# Patient Record
Sex: MALE | Race: WHITE | Employment: FULL TIME | URBAN - METROPOLITAN AREA
[De-identification: names, ages, dates, MRNs, and addresses within clinical notes are randomized per-mention and may not be internally consistent; named-entity substitution may affect disease eponyms.]

---

## 2017-06-02 ENCOUNTER — HOSPITAL ENCOUNTER (OUTPATIENT)
Dept: LAB | Age: 27
Discharge: HOME | End: 2017-06-02

## 2017-06-02 PROCEDURE — 88305 TISSUE EXAM BY PATHOLOGIST: CPT | Performed by: INTERNAL MEDICINE

## 2021-07-16 ENCOUNTER — TRANSCRIBE ORDER (OUTPATIENT)
Dept: SCHEDULING | Age: 31
End: 2021-07-16

## 2021-07-16 DIAGNOSIS — M54.12 CERVICAL RADICULOPATHY: Primary | ICD-10-CM

## 2021-08-03 ENCOUNTER — HOSPITAL ENCOUNTER (OUTPATIENT)
Dept: MRI IMAGING | Age: 31
Discharge: HOME | End: 2021-08-03
Attending: INTERNAL MEDICINE
Payer: COMMERCIAL

## 2021-08-03 DIAGNOSIS — M54.12 CERVICAL RADICULOPATHY: ICD-10-CM

## 2021-08-03 PROCEDURE — 72141 MRI NECK SPINE W/O DYE: CPT

## 2021-08-26 PROBLEM — M54.2 NECK PAIN: Status: ACTIVE | Noted: 2021-08-26

## 2021-08-26 PROBLEM — M50.30 DDD (DEGENERATIVE DISC DISEASE), CERVICAL: Status: ACTIVE | Noted: 2021-08-26

## 2022-03-18 PROBLEM — M50.30 DDD (DEGENERATIVE DISC DISEASE), CERVICAL: Status: ACTIVE | Noted: 2021-08-26

## 2022-03-19 PROBLEM — M54.2 NECK PAIN: Status: ACTIVE | Noted: 2021-08-26

## 2022-07-20 ENCOUNTER — OFFICE VISIT (OUTPATIENT)
Dept: NEUROSURGERY | Age: 32
End: 2022-07-20
Payer: COMMERCIAL

## 2022-07-20 VITALS
BODY MASS INDEX: 39.37 KG/M2 | TEMPERATURE: 98.1 F | HEART RATE: 89 BPM | SYSTOLIC BLOOD PRESSURE: 125 MMHG | DIASTOLIC BLOOD PRESSURE: 55 MMHG | WEIGHT: 275 LBS | OXYGEN SATURATION: 96 % | HEIGHT: 70 IN

## 2022-07-20 DIAGNOSIS — M54.50 LOW BACK PAIN, UNSPECIFIED BACK PAIN LATERALITY, UNSPECIFIED CHRONICITY, UNSPECIFIED WHETHER SCIATICA PRESENT: Primary | ICD-10-CM

## 2022-07-20 PROCEDURE — 99214 OFFICE O/P EST MOD 30 MIN: CPT | Performed by: NURSE PRACTITIONER

## 2022-07-20 RX ORDER — MELOXICAM 15 MG/1
15 TABLET ORAL DAILY
Qty: 30 TABLET | Refills: 3 | Status: SHIPPED | OUTPATIENT
Start: 2022-07-20

## 2022-07-20 ASSESSMENT — PATIENT HEALTH QUESTIONNAIRE - PHQ9
1. LITTLE INTEREST OR PLEASURE IN DOING THINGS: 0
SUM OF ALL RESPONSES TO PHQ QUESTIONS 1-9: 0
2. FEELING DOWN, DEPRESSED OR HOPELESS: 0
SUM OF ALL RESPONSES TO PHQ9 QUESTIONS 1 & 2: 0

## 2022-07-20 NOTE — PROGRESS NOTES
[facility] NOTE:   History of Present Illness:    CC: Right-sided low back pain    Emilie Donald is a 32 y.o. male here to be evaluated for right-sided low back pain. Patient states that he has been having a lot of pain just off to the right side of his tailbone down into his buttock. Patient states that the pain is made worse with sitting for long periods of time. Patient states he will often feel very stiff. Patient states that every so often he will have a little bit of pain that runs in his right leg down into the back of his calf. Patient states every 2 months or so he will have an episode where the pain is much worse than normal.  Patient notes that he has been seeing a chiropractor to help with this. Patient states he has been noticing this over the past year.     Past Medical History:   Diagnosis Date    Adenomatous colon polyp     Asthma     BMI 39.0-39.9,adult     Cervical radiculopathy     HLD (hyperlipidemia)     Vitamin D deficiency       Past Surgical History:   Procedure Laterality Date    APPENDECTOMY      COLONOSCOPY  2014    adenomatous colon polyp  2017 dr. Vasquez Francisco EXTRACTION       Allergies   Allergen Reactions    Wasp Venom Protein Anaphylaxis    Amoxicillin Diarrhea    Other      cats      Family History   Problem Relation Age of Onset    Colon Cancer Maternal Grandfather     Thyroid Disease Mother     Alcohol Abuse Father     Heart Disease Brother     Parkinson's Disease Maternal Grandmother     Coronary Art Dis Father       Social History     Socioeconomic History    Marital status:      Spouse name: Not on file    Number of children: Not on file    Years of education: Not on file    Highest education level: Not on file   Occupational History    Not on file   Tobacco Use    Smoking status: Never    Smokeless tobacco: Never   Substance and Sexual Activity    Alcohol use: No    Drug use: No    Sexual activity: Not on file   Other Topics Concern    Not on file   Social History Narrative    Not on file     Social Determinants of Health     Financial Resource Strain: Not on file   Food Insecurity: Not on file   Transportation Needs: Not on file   Physical Activity: Not on file   Stress: Not on file   Social Connections: Not on file   Intimate Partner Violence: Not on file   Housing Stability: Not on file     Current Outpatient Medications   Medication Sig Dispense Refill    albuterol sulfate  (90 Base) MCG/ACT inhaler Inhale 2 puffs into the lungs every 6 hours as needed      EPINEPHrine (EPIPEN) 0.3 MG/0.3ML SOAJ injection Inject 0.3 mg into the muscle once as needed       No current facility-administered medications for this visit. Patient Active Problem List   Diagnosis    DDD (degenerative disc disease), cervical    Neck pain          ROS Review of Systems    Constitutional:                    No recent weight changes, fever, fatigue, sleep difficulties, loss of appetite   ENT/Mouth:  No hearing loss, No ringing in the ears, chronic sinus problem, nose bleeds,sore throat, voice change, hoarseness, swollen glands in neck, or difficulties with chewing and swallowing. Cardiovascular:  No chest pain/angina pectoris, palpitations, swelling of feet/ankles/hands, or calf pain while walking. Respiratory: No chronic or frequent coughs, spitting up blood, shortness of breath, No asthma, or wheezing. Gastrointestinal: No a bdominal pain, heartburn, nausea, vomiting, constipation, or frequent diarrhea     Genitourinary: No frequent urination, burning or painful urination, or blood in urine     Musculoskeletal:   POS low back pain     Integument:   No rash/itching     Neurological:   Dizziness/vertigo, No numbness/tingling sensation, tremors, No weakness in limbs, frequent or recurring headaches, memory loss or confusion.        Physical Exam:    General: No acute distress  Head normocephalic and atraumatic  Mood and affect appropriate  CV: Regular rate   Resp: No increased work of breathing  Skin: warm and dry   Awake, alert, and oriented   Speech fluent  Eyes open spontaneously   Face symmetric and tongue midline on protrusion  Sternocleidomastoid and trapezius 5/5  No mid-line cervical, thoracic, or lumbar tenderness to palpation   Patient with strength exam as follows:   Upper Extremities: Right Left      Deltoid  5 5    Biceps  5 5    Triceps 5 5      5 5   Hand Intrinsics  5 5  Wrist flexors/extensors  5 5     Lower Extremities:      Hip Flex 5 5    Quads  5 5    Hamstrings 5 5    Dorsiflex 5 5    Plantarflex 5 5    EHL  5 5  Sensation intact to light touch and pin-prick   DTR 2+  No clonus or babinski present   No Yadav's sign present bilaterally   Gait normal    Assessment & Plan:  Randol Severs is a 32 y.o. male who presents to be evaluated for his right-sided low back pain. Understanding the patient for a 6-week course of physical therapy for his low back. I am also calling in a prescription for Mobic for the patient to take help with inflammation he may be experiencing. Patient will follow up with me in 5 weeks to discuss efficacy of therapy. No diagnosis found. Notes are transcribed with [employer], a medical voice recording dictation service, and may contain minor errors.     Román Serrano, EDDIE  [address]

## 2022-08-02 ENCOUNTER — HOSPITAL ENCOUNTER (OUTPATIENT)
Dept: PHYSICAL THERAPY | Age: 32
Setting detail: RECURRING SERIES
Discharge: HOME | End: 2022-08-05
Payer: COMMERCIAL

## 2022-08-02 PROCEDURE — 97161 PT EVAL LOW COMPLEX 20 MIN: CPT

## 2022-08-02 PROCEDURE — 97110 THERAPEUTIC EXERCISES: CPT

## 2022-08-02 ASSESSMENT — PAIN SCALES - GENERAL: PAINLEVEL_OUTOF10: 1

## 2022-08-02 NOTE — THERAPY EVALUATION
Edmar Barrett  : 1990  Primary: Gabbi Bahenaanurag Christensen  Secondary:  [address] [facility] [address]  Phone: [phone]  Fax: [phone] Plan Frequency: 2x/ week for 6 weeks  Plan of Care/Certification Expiration Date: 22    PT Visit Info:    No data recorded    Visit Count:  1    OUTPATIENT PHYSICAL THERAPY:OP NOTE TYPE: Initial Assessment 2022               Episode  Appt Desk         Treatment Diagnosis:    Low Back Pain (M54.5)  Abnormal posture (R29.3)  Medical/Referring Diagnosis:  No admission diagnoses are documented for this encounter. Referring Physician:  ELSIE Clancy MD Orders:  PT Eval and Treat   Return MD Appt:  22  Date of Onset:  No data recorded   Allergies:  Wasp venom protein, Amoxicillin, and Other  Restrictions/Precautions:    No data recordedNo data recorded   Medications Last Reviewed:  2022     SUBJECTIVE   History of Injury/Illness (Reason for Referral):  Pt has had low back pain for last couple of years. Pt had a radicular pattern from his low back to his gluteal fold on his right side on . The radicular pattern only occurs every month or so when his low back pain has increased. Patient Stated Goal(s): \"Pt stated he wants to be able to /play with his 3 kids without low back pain. \"  Initial:     1/10 Post Session:     0/10  Past Medical History/Comorbidities:   Mr. Angeli Mejía  has a past medical history of Adenomatous colon polyp, Asthma, BMI 39.0-39.9,adult, Cervical radiculopathy, HLD (hyperlipidemia), and Vitamin D deficiency. Mr. Angeli Mejía  has a past surgical history that includes Appendectomy; Colonoscopy (); Septoplasty; and Moodus tooth extraction. Social History/Living Environment:   Lives With: Spouse;  Family  Type of Home: House  Home Layout: Multi-level   Prior Level of Function/Work/Activity:   Prior level of function: Independent  Occupation: Full time employment  Type of Occupation: IT tech  ADL Assistance: IndependentMode of Transportation: [address] recorded   Learning:   Does the patient/guardian have any barriers to learning?: No barriers  Will there be a co-learner?: No  What is the preferred language of the patient/guardian?: English  Is an  required?: No   Fall Risk Scale:   No data recorded         OBJECTIVE     Observation/Orthostatic Postural Assessment:  [] This section not tested    General observations   Right lateral shift        Range of Motion   [] This section not tested    AROM     Lumbar    Flexion: mid-shin    Extension: pass midline      Hip         PROM    Hip            Strength  [] This section not tested      Right Left   Hip     Flexion 5/5 5/5   Extension       Abduction       Adduction       Knee     Flexion 5/5 5/5   Extension 5/5 5/5   Ankle     Dorsiflexion 5/5 5/5   Plantarflexion             Special Tests  [] This section not tested    Test/Result   Lumbar:       Straight Leg Raise Test; right: +, left: -      Slump Test: +          Hip:               Joint Accessory Motion Assessment/Palpation   [x] This section not tested    Joint         Palpation           Neurological Screen  [x] This section not tested    Test/Result   Dermatomes:       L UE:        R UE:        L LE:        R LE:    Myotomes:       L UE:        R UE:        L LE:        R LE:    Reflexes:       Upper Quarter:        Lower Quarter            Functional Mobility  [] This section not tested    Test Result   Timed up and go     30 second sit to stand  11   6 minute walk test     Single Leg Balance         Test Comments   Sit to stand    Squat    Stair Negotiation       ASSESSMENT   Initial Assessment:    Layne Medina presents to physical therapy with decreased strength, ROM, joint mobility, functional mobility. These S/S are consistent with low back pain.  Patient will benefit from skilled physical therapy for manual therapeutic techniques (as appropriate), therapeutic exercises and activities, balance and comprehensive home exercises program to address current impairments and functional limitations. Problem List: (Impacting functional limitations): Body Structures, Functions, Activity Limitations Requiring Skilled Therapeutic Intervention: Decreased body mechanics; Decreased functional mobility ; Decreased coordination; Decreased posture; Decreased strength   Therapy Prognosis:   Therapy Prognosis: Excellent   Assessment Complexity:   Low Complexity  PLAN   Effective Dates: 08/02/22 TO Plan of Care/Certification Expiration Date: 09/13/22   Frequency/Duration: Plan Frequency: 2x/ week for 6 weeks   Interventions Planned (Treatment may consist of any combination of the following):    Current Treatment Recommendations: Strengthening; ROM; Balance training; Functional mobility training; Stair training; Manual Therapy - Soft Tissue Mobilization; Manual Therapy - Joint Manipulation; Return to work related activity; Home exercise program       Goals: (Goals have been discussed and agreed upon with patient.) In Progress Goal Met  Goal Not met   Short-Term Functional Goals: Time Frame: 3 weeks      1. Emilie Donald will be independent with HEP. [x] [] []   2. Emilie Donald will be able to centralize his low back pain to regain daily function. [x] [] []   3. Emilie Donald will be able to sit longer than 30 minutes with less than or equal to 1/10 low back pain. [x] [] []         Discharge Goals: Time Frame: 6 weeks       1. Emilie Donald will be able to deadlift his body weight in order to go back to functional activities of lifting. [x] [] []   2. Emilie Donald will be able to squat 50% if his body weight in order to do his job requirements. [x] [] []   3. Emilie Donald will be able to improve his modified oswetry low back pain questionnaire by the [employer] in order to regain function. [x] [] []              Outcome Measure:    Tool Used: Modified Oswestry Low

## 2022-08-03 NOTE — PROGRESS NOTES
Gentry Veloz  : 1990  Primary: Alicja Ontiveros Sc  Secondary:  [address] [facility] [address]  Phone: [phone]  Fax: [phone] Plan Frequency: 2x/ week for 6 weeks    Plan of Care/Certification Expiration Date: 22      PT Visit Info:  No data recorded   Visit Count:  1   OUTPATIENT PHYSICAL THERAPY:OP NOTE TYPE: Treatment Note 2022       Episode  }Appt Desk             Treatment Diagnosis:    Low Back Pain (M54.5)  Abnormal posture (R29.3)  Medical/Referring Diagnosis:  No admission diagnoses are documented for this encounter. Referring Physician:  ELSIE Delgado MD Orders:  PT Eval and Treat   Date of Onset:  No data recorded   Allergies:   Wasp venom protein, Amoxicillin, and Other  Restrictions/Precautions:  No data recordedNo data recorded   Interventions Planned (Treatment may consist of any combination of the following):    Current Treatment Recommendations: Strengthening; ROM; Balance training; Functional mobility training; Stair training; Manual Therapy - Soft Tissue Mobilization; Manual Therapy - Joint Manipulation; Return to work related activity; Home exercise program     Subjective Comments:  See Eval  Initial:}    1/10Post Session:       0/10  Medications Last Reviewed:  2022  Updated Objective Findings:  See evaluation note from today  Treatment     THERAPEUTIC EXERCISE: (25 minutes):    Exercises per grid below to improve mobility, strength, balance, and coordination. Progressed resistance and repetitions as indicated. Date:  22 Date:   Date:     Activity/Exercise Parameters Parameters Parameters   Education HEP, expectations, centralization/peripheralization     Prone to elbows (extension) 3 min     Open book (sideline) 3 min     Lateral shift (against wall) 10xs                           THERAPEUTIC ACTIVITY: ( 0 minutes):     Therapeutic activities per grid below to improve mobility, strength, coordination, and dynamic movement to improve functional lifting, carrying, reaching, catching, and overhead activities. Date:   Date:   Date:     Activity/Exercise Parameters Parameters Parameters                                                 MANUAL THERAPY: ( minutes):   Joint mobilization, Soft tissue mobilization, and Manipulation was utilized and necessary because of the patient's restricted joint motion, painful spasm, loss of articular motion, and restricted motion of soft tissue. Date  8/2/2022      Technique Used Grade Level # Time(s) Effect while being performed                                                                                         HEP Log Date        2.     3.    4.     5.        POC    Recertification Expiration Date      Plan of Care/Certification Expiration Date: 09/13/22     Visit Count  1    Number of Allowed Visits              Treatment/Session Summary:      Treatment Assessment:    See Eval   Communication/Consultation:       None today   Equipment provided today:  None   Recommendations/Intent for next  treatment session:  Next visit will focus on Centralization (lumbar extension) .       Total Treatment Billable Duration:  25 minutes  Time In: 9691  [facility]       Charge Capture  }Post Session Pain  PT Visit Info  [employer] Portal  MD Guidelines  Scanned Media  Benefits  MyChart    Future Appointments   Date Time Provider Mau [place]   8/4/2022  3:30 PM Ryan Carpenter, PT [facility]   8/9/2022  3:15 PM Ryan Carpenter, PT [facility]   8/11/2022  3:15 PM Ryan Carpenter, PT [facility]   8/16/2022  3:30 PM Ryan Carpenter, PT [facility]   8/23/2022  3:30 PM Ryan Carpenter, PT [facility]   8/24/2022  1:30 PM Cassius Vizcarra APRN - CNP [facility]   8/25/2022  3:30 PM Ryan Carpenter, PT [facility]   8/30/2022  3:30 PM Ryan Carpenter, PT [facility]   9/1/2022  3:30 PM Ryan Carpenter, PT [facility]

## 2022-08-04 ENCOUNTER — HOSPITAL ENCOUNTER (OUTPATIENT)
Dept: PHYSICAL THERAPY | Age: 32
Setting detail: RECURRING SERIES
Discharge: HOME | End: 2022-08-07
Payer: COMMERCIAL

## 2022-08-04 PROCEDURE — 97110 THERAPEUTIC EXERCISES: CPT

## 2022-08-04 NOTE — PROGRESS NOTES
Lidia Campos  : 1990  Primary: Kentrell Shar Christensen  Secondary:  [address] [facility] [address]  Phone: [phone]  Fax: [phone] Plan Frequency: 2x/ week for 6 weeks    Plan of Care/Certification Expiration Date: 22      PT Visit Info:  No data recorded   Visit Count:  2   OUTPATIENT PHYSICAL THERAPY:OP NOTE TYPE: Treatment Note 2022       Episode  }Appt Desk             Treatment Diagnosis:    Low Back Pain (M54.5)  Abnormal posture (R29.3)  Medical/Referring Diagnosis:  No admission diagnoses are documented for this encounter. Referring Physician:  Valere Boxer, APRN - C* MD Orders:  PT Eval and Treat   Date of Onset:  No data recorded   Allergies:   Wasp venom protein, Amoxicillin, and Other  Restrictions/Precautions:  No data recordedNo data recorded   Interventions Planned (Treatment may consist of any combination of the following):    Current Treatment Recommendations: Strengthening; ROM; Balance training; Functional mobility training; Stair training; Manual Therapy - Soft Tissue Mobilization; Manual Therapy - Joint Manipulation; Return to work related activity; Home exercise program     Subjective Comments:  Pt reports mild stiffness after last visit but no pain down his leg. Initial:}     /10Post Session:        /10  Medications Last Reviewed:  2022  Updated Objective Findings:  See evaluation note from today  Treatment     THERAPEUTIC EXERCISE: (45 minutes):    Exercises per grid below to improve mobility, strength, balance, and coordination. Progressed resistance and repetitions as indicated.      Date:  22 Date:  2022 Date:     Activity/Exercise Parameters Parameters Parameters   Education HEP, expectations, centralization/peripheralization     Prone to elbows (extension) 3 min 5 min    Open book (sideline) 3 min     Lateral shift (against wall) 10xs 10 xs    treadmill  4-1  for 2 sets     Rolling patterns  10 min    rows  37lbs 3x10    Lats  43 lbs 3x 10    SLED  100 lbs 4 laps    Goblet squats  15lbs 3x10        THERAPEUTIC ACTIVITY: ( 0 minutes): Therapeutic activities per grid below to improve mobility, strength, coordination, and dynamic movement to improve functional lifting, carrying, reaching, catching, and overhead activities. Date:   Date:   Date:     Activity/Exercise Parameters Parameters Parameters                                                 MANUAL THERAPY: ( minutes):   Joint mobilization, Soft tissue mobilization, and Manipulation was utilized and necessary because of the patient's restricted joint motion, painful spasm, loss of articular motion, and restricted motion of soft tissue. Date  8/4/2022      Technique Used Grade Level # Time(s) Effect while being performed                                                                                         HEP Log Date        2.     3.    4.     5.        POC    Recertification Expiration Date      Plan of Care/Certification Expiration Date: 09/13/22     Visit Count  2    Number of Allowed Visits              Treatment/Session Summary:      Treatment Assessment:    Pt had full centralization with back pain with nikole mild soreness post treatment. Communication/Consultation:       None today   Equipment provided today:  None   Recommendations/Intent for next  treatment session:  Next visit will focus on Centralization (lumbar extension) .       Total Treatment Billable Duration:  45 minutes  Time In: 8186  Time Out: [address] [facility], PT       Charge Capture  }Post Session Pain  PT Visit Info  [employer] Portal  MD Guidelines  Scanned Media  Benefits  MyChart    Future Appointments   Date Time Provider Mau [place]   8/9/2022  3:15 PM Holly Reagan PT [facility]   8/11/2022  3:15 PM Holly Reagan PT [facility]   8/16/2022  3:30 PM Holly Reagan PT [facility]   8/18/2022  3:30 PM Holly Reagan PT [facility] 8/23/2022  3:30 PM Jaems Nuñez, PT [facility]   8/24/2022  1:30 PM Tere Mike, APRN - CNP [facility]   8/25/2022  3:30 PM James Nuñez, PT [facility]   8/30/2022  3:30 PM James Nuñez, PT [facility]   9/1/2022  3:30 PM James Nuñez, PT [facility]

## 2022-08-09 ENCOUNTER — APPOINTMENT (OUTPATIENT)
Dept: PHYSICAL THERAPY | Age: 32
End: 2022-08-09
Payer: COMMERCIAL

## 2022-08-16 ENCOUNTER — APPOINTMENT (OUTPATIENT)
Dept: PHYSICAL THERAPY | Age: 32
End: 2022-08-16
Payer: COMMERCIAL

## 2022-08-18 ENCOUNTER — HOSPITAL ENCOUNTER (OUTPATIENT)
Dept: PHYSICAL THERAPY | Age: 32
Setting detail: RECURRING SERIES
Discharge: HOME | End: 2022-08-21
Payer: COMMERCIAL

## 2022-08-18 PROCEDURE — 97110 THERAPEUTIC EXERCISES: CPT

## 2022-08-18 ASSESSMENT — PAIN SCALES - GENERAL: PAINLEVEL_OUTOF10: 1

## 2022-08-18 NOTE — PROGRESS NOTES
Hamlet Antunez  : 1990  Primary: Otto Christensen  Secondary:  [address] [facility] [address]  Phone: [phone]  Fax: [phone] Plan Frequency: 2x/ week for 6 weeks    Plan of Care/Certification Expiration Date: 22      PT Visit Info:  No data recorded   Visit Count:  3   OUTPATIENT PHYSICAL THERAPY:OP NOTE TYPE: Treatment Note 2022       Episode  }Appt Desk             Treatment Diagnosis:    Low Back Pain (M54.5)  Abnormal posture (R29.3)  Medical/Referring Diagnosis:  No admission diagnoses are documented for this encounter. Referring Physician:  Darryle Columbus, APRN - C* MD Orders:  PT Eval and Treat   Date of Onset:  No data recorded   Allergies:   Wasp venom protein, Amoxicillin, and Other  Restrictions/Precautions:  No data recordedNo data recorded   Interventions Planned (Treatment may consist of any combination of the following):    Current Treatment Recommendations: Strengthening; ROM; Balance training; Functional mobility training; Stair training; Manual Therapy - Soft Tissue Mobilization; Manual Therapy - Joint Manipulation; Return to work related activity; Home exercise program     Subjective Comments:  Pt reports that he is doing well and has a good reduction in symptoms. Initial:}    1/10Post Session:       0/10  Medications Last Reviewed:  2022  Updated Objective Findings:  See evaluation note from today  Treatment     THERAPEUTIC EXERCISE: (45 minutes):    Exercises per grid below to improve mobility, strength, balance, and coordination. Progressed resistance and repetitions as indicated.      Date:  22 Date:  2022 Date:  2022   Activity/Exercise Parameters Parameters Parameters   Education HEP, expectations, centralization/peripheralization     Prone to elbows (extension) 3 min 5 min 10 press ups   Open book (sideline) 3 min     Lateral shift (against wall) 10xs 10 xs    treadmill  4-1  for 2 sets

## 2022-08-23 ENCOUNTER — HOSPITAL ENCOUNTER (OUTPATIENT)
Dept: PHYSICAL THERAPY | Age: 32
Setting detail: RECURRING SERIES
Discharge: HOME | End: 2022-08-26
Payer: COMMERCIAL

## 2022-08-23 PROCEDURE — 97110 THERAPEUTIC EXERCISES: CPT

## 2022-08-23 ASSESSMENT — PAIN SCALES - GENERAL: PAINLEVEL_OUTOF10: 0

## 2022-08-23 NOTE — PROGRESS NOTES
Mandeep Caldera  : 1990  Primary: Noelle Macias Sc  Secondary:  [address] @ [address] [facility] [address]  Phone: [phone]  Fax: [phone] Plan Frequency: 2x/ week for 6 weeks    Plan of Care/Certification Expiration Date: 22      PT Visit Info:  No data recorded   Visit Count:  4   OUTPATIENT PHYSICAL THERAPY:OP NOTE TYPE: Treatment Note 2022       Episode  }Appt Desk             Treatment Diagnosis:    Low Back Pain (M54.5)  Abnormal posture (R29.3)  Medical/Referring Diagnosis:  No admission diagnoses are documented for this encounter. Referring Physician:  ELSIE Adame MD Orders:  PT Eval and Treat   Date of Onset:  No data recorded   Allergies:   Wasp venom protein, Amoxicillin, and Other  Restrictions/Precautions:  No data recordedNo data recorded   Interventions Planned (Treatment may consist of any combination of the following):    Current Treatment Recommendations: Strengthening; ROM; Balance training; Functional mobility training; Stair training; Manual Therapy - Soft Tissue Mobilization; Manual Therapy - Joint Manipulation; Return to work related activity; Home exercise program     Subjective Comments:  Pt reports soreness fro 2 days after last visit but felt great the last two days. Initial:}    0/10Post Session:       0/10  Medications Last Reviewed:  2022  Updated Objective Findings:  See evaluation note from today  Treatment     THERAPEUTIC EXERCISE: (45 minutes):    Exercises per grid below to improve mobility, strength, balance, and coordination. Progressed resistance and repetitions as indicated.      Date:  22 Date:  2022 Date:  2022 Date  2022   Activity/Exercise Parameters Parameters Parameters    Education HEP, expectations, centralization/peripheralization      Prone to elbows (extension) 3 min 5 min 10 press ups 10 press ups   Open book (sideline) 3 min      Lateral shift (against wall) 10xs 10 xs     treadmill  4-1  for 2 sets  10 min 2-2-2-2-2 10 min 2-2-2-2-2   Rolling patterns  10 min 6 min 3 rolls each side   rows  37lbs 3x10     Sidestep monster walks   Mint band 2xs Pink band 2xs   Deadlifts   65lbs 3x5 with coaching  Bar x5  75lbs 1x5  85lbs 3x5   Lats  43 lbs 3x 10     SLED  100 lbs 4 laps  100 lbs 4 laps   Goblet squats  15lbs 3x10 3x10        THERAPEUTIC ACTIVITY: ( 0 minutes): Therapeutic activities per grid below to improve mobility, strength, coordination, and dynamic movement to improve functional lifting, carrying, reaching, catching, and overhead activities. Date:   Date:   Date:     Activity/Exercise Parameters Parameters Parameters                                                 MANUAL THERAPY: ( minutes):   Joint mobilization, Soft tissue mobilization, and Manipulation was utilized and necessary because of the patient's restricted joint motion, painful spasm, loss of articular motion, and restricted motion of soft tissue. Date  8/23/2022      Technique Used Grade Level # Time(s) Effect while being performed                                                                                         HEP Log Date        2.     3.    4.     5.        POC    Recertification Expiration Date      Plan of Care/Certification Expiration Date: 09/13/22     Visit Count  4    Number of Allowed Visits              Treatment/Session Summary:      Treatment Assessment:    COntinued with progression of exercises and pat felt no pain in his back post treatment. ABle to tolerate progressions well. Communication/Consultation:       None today   Equipment provided today:  None   Recommendations/Intent for next  treatment session:  Next visit will focus on Centralization (lumbar extension) .       Total Treatment Billable Duration:  39 minutes  Time In: 8792  Time Out: [address] [facility] [place] PT       Charge Capture  }Post Session Pain  PT Visit [address]  MD Guidelines  Scanned Media  Benefits  MyChart    Future Appointments   Date Time Provider Mau [place]   8/24/2022  1:30 PM Ann-Marie Tinajero, APRN - CNP [facility]   8/25/2022  3:30 PM George Shah, PT [facility]   8/30/2022  3:30 PM George Shah, PT [facility]   9/1/2022  3:30 PM George Shah, PT [facility]   9/8/2022  3:30 PM George Shah, PT [facility]   9/13/2022  3:30 PM George Shah, PT [facility]

## 2022-08-24 ENCOUNTER — OFFICE VISIT (OUTPATIENT)
Dept: NEUROSURGERY | Age: 32
End: 2022-08-24
Payer: COMMERCIAL

## 2022-08-24 VITALS
HEART RATE: 96 BPM | SYSTOLIC BLOOD PRESSURE: 109 MMHG | DIASTOLIC BLOOD PRESSURE: 70 MMHG | BODY MASS INDEX: 40.09 KG/M2 | HEIGHT: 70 IN | TEMPERATURE: 98.4 F | WEIGHT: 280 LBS | OXYGEN SATURATION: 96 %

## 2022-08-24 DIAGNOSIS — M54.50 LOW BACK PAIN, UNSPECIFIED BACK PAIN LATERALITY, UNSPECIFIED CHRONICITY, UNSPECIFIED WHETHER SCIATICA PRESENT: Primary | ICD-10-CM

## 2022-08-24 PROCEDURE — 99213 OFFICE O/P EST LOW 20 MIN: CPT | Performed by: NURSE PRACTITIONER

## 2022-08-24 ASSESSMENT — PATIENT HEALTH QUESTIONNAIRE - PHQ9
SUM OF ALL RESPONSES TO PHQ9 QUESTIONS 1 & 2: 0
2. FEELING DOWN, DEPRESSED OR HOPELESS: 0
1. LITTLE INTEREST OR PLEASURE IN DOING THINGS: 0
SUM OF ALL RESPONSES TO PHQ QUESTIONS 1-9: 0

## 2022-08-24 NOTE — PROGRESS NOTES
[facility] NOTE:   History of Present Illness:    CC: Physical therapy follow-up    Shyla Hillman is a 32 y.o. male here to follow-up after completing lumbar physical therapy. Patient states he still having some pain off to the right side of his tailbone that runs down into his right buttock. Patient states the physical therapy does seem to have eased off any aches and pains that went into his legs. Patient states that the Mobic has also helped to make him more comfortable during the day but ultimately by the nighttime the medication has worn off and his symptoms return.     Past Medical History:   Diagnosis Date    Adenomatous colon polyp     Asthma     BMI 39.0-39.9,adult     Cervical radiculopathy     HLD (hyperlipidemia)     Vitamin D deficiency       Past Surgical History:   Procedure Laterality Date    APPENDECTOMY      COLONOSCOPY  2014    adenomatous colon polyp  2017 dr. Meagan Zuñiga EXTRACTION       Allergies   Allergen Reactions    Wasp Venom Protein Anaphylaxis    Amoxicillin Diarrhea    Other      cats      Family History   Problem Relation Age of Onset    Colon Cancer Maternal Grandfather     Thyroid Disease Mother     Alcohol Abuse Father     Heart Disease Brother     Parkinson's Disease Maternal Grandmother     Coronary Art Dis Father       Social History     Socioeconomic History    Marital status:      Spouse name: Not on file    Number of children: Not on file    Years of education: Not on file    Highest education level: Not on file   Occupational History    Not on file   Tobacco Use    Smoking status: Never    Smokeless tobacco: Never   Substance and Sexual Activity    Alcohol use: No    Drug use: No    Sexual activity: Not on file   Other Topics Concern    Not on file   Social History Narrative    Not on file     Social Determinants of Health     Financial Resource Strain: Not on file   Food Insecurity: Not on file Transportation Needs: Not on file   Physical Activity: Not on file   Stress: Not on file   Social Connections: Not on file   Intimate Partner Violence: Not on file   Housing Stability: Not on file     Current Outpatient Medications   Medication Sig Dispense Refill    meloxicam (MOBIC) 15 MG tablet Take 1 tablet by mouth in the morning. 30 tablet 3    albuterol sulfate  (90 Base) MCG/ACT inhaler Inhale 2 puffs into the lungs every 6 hours as needed      EPINEPHrine (EPIPEN) 0.3 MG/0.3ML SOAJ injection Inject 0.3 mg into the muscle once as needed       No current facility-administered medications for this visit. Patient Active Problem List   Diagnosis    DDD (degenerative disc disease), cervical    Neck pain          ROS Review of Systems    Constitutional:                    No recent weight changes, fever, fatigue, sleep difficulties, loss of appetite   ENT/Mouth:  No hearing loss, No ringing in the ears, chronic sinus problem, nose bleeds,sore throat, voice change, hoarseness, swollen glands in neck, or difficulties with chewing and swallowing. Cardiovascular:  No chest pain/angina pectoris, palpitations, swelling of feet/ankles/hands, or calf pain while walking. Respiratory: No chronic or frequent coughs, spitting up blood, shortness of breath, No asthma, or wheezing. Gastrointestinal: No a bdominal pain, heartburn, nausea, vomiting, constipation, or frequent diarrhea     Genitourinary: No frequent urination, burning or painful urination, or blood in urine     Musculoskeletal:   POS low back pain     Integument:   No rash/itching     Neurological:   Dizziness/vertigo, No numbness/tingling sensation, tremors, No weakness in limbs, frequent or recurring headaches, memory loss or confusion.        Physical Exam:    General: No acute distress  Head normocephalic and atraumatic  Mood and affect appropriate  CV: Regular rate   Resp: No increased work of breathing  Skin: warm and dry   Awake, alert, and oriented   Speech fluent  Eyes open spontaneously   Face symmetric and tongue midline on protrusion  Sternocleidomastoid and trapezius 5/5  No mid-line cervical, thoracic, or lumbar tenderness to palpation   Patient with strength exam as follows:   Upper Extremities: Right Left      Deltoid  5 5    Biceps  5 5    Triceps  5 5      5 5   Hand Intrinsics  5 5  Wrist flexors/extensors  5 5     Lower Extremities:      Hip Flex 5 5    Quads  5 5    Hamstrings 5 5    Dorsiflex 5 5    Plantarflex 5 5    EHL  5 5  Sensation intact to light touch and pin-prick   DTR 2+  No clonus or babinski present   No Yadav's sign present bilaterally   Gait normal    Assessment & Plan:  Gentry Veloz is a 32 y.o. male who presents to follow-up after completing lumbar physical therapy. I am sending the patient for a lumbar MRI without contrast to check for any evidence of nerve compromise. Patient will follow up with me after the imaging is complete to review the study. No diagnosis found. Notes are transcribed with [employer], a medical voice recording dictation service, and may contain minor errors.     Cecile Pap, NP  [address]

## 2022-08-25 ENCOUNTER — HOSPITAL ENCOUNTER (OUTPATIENT)
Dept: PHYSICAL THERAPY | Age: 32
Setting detail: RECURRING SERIES
Discharge: HOME | End: 2022-08-28
Payer: COMMERCIAL

## 2022-08-25 PROCEDURE — 97110 THERAPEUTIC EXERCISES: CPT

## 2022-08-25 NOTE — PROGRESS NOTES
Rico Alonzo  : 1990  Primary: Pinedo Fulling Sc  Secondary:  [address] [facility] [address]  Phone: [phone]  Fax: [phone] Plan Frequency: 2x/ week for 6 weeks    Plan of Care/Certification Expiration Date: 22      PT Visit Info:  No data recorded   Visit Count:  5   OUTPATIENT PHYSICAL THERAPY:OP NOTE TYPE: Treatment Note 2022       Episode  }Appt Desk             Treatment Diagnosis:    Low Back Pain (M54.5)  Abnormal posture (R29.3)  Medical/Referring Diagnosis:  No admission diagnoses are documented for this encounter. Referring Physician:  ELSIE Fish MD Orders:  PT Eval and Treat   Date of Onset:  No data recorded   Allergies:   Wasp venom protein, Amoxicillin, and Other  Restrictions/Precautions:  No data recordedNo data recorded   Interventions Planned (Treatment may consist of any combination of the following):    Current Treatment Recommendations: Strengthening; ROM; Balance training; Functional mobility training; Stair training; Manual Therapy - Soft Tissue Mobilization; Manual Therapy - Joint Manipulation; Return to work related activity; Home exercise program     Subjective Comments:  Pt reports that he is doing better and has nt much soreness post last treatment. Initial:}     /10Post Session:        /10  Medications Last Reviewed:  2022  Updated Objective Findings:  See evaluation note from today  Treatment     THERAPEUTIC EXERCISE: (45 minutes):    Exercises per grid below to improve mobility, strength, balance, and coordination. Progressed resistance and repetitions as indicated.      Date:  2022 Date:  2022 Date  2022 Date  2022   Activity/Exercise Parameters Parameters     Education       Prone to elbows (extension) 5 min 10 press ups 10 press ups    Open book (sideline)       Lateral shift (against wall) 10 xs      treadmill 4-1  for 2 sets  10 min 2-2-2-2-2 10 min 2-2-2-2-2 10 min 3.1 mph   Rolling patterns 10 min 6 min 3 rolls each side 3 rolls each side   rows 37lbs 3x10      Sidestep monster walks  Mint band 2xs Pink band 2xs Pink band 3xs   Deadlifts  65lbs 3x5 with coaching  Bar x5  75lbs 1x5  85lbs 3x5 75 1x5  95 3x5   Lats 43 lbs 3x 10      SLED 100 lbs 4 laps  100 lbs 4 laps    Goblet squats 15lbs 3x10 3x10         3 rounds   20lbs sit to stands  8 burpees  2 laps of 145 lbs SLED       THERAPEUTIC ACTIVITY: ( 0 minutes): Therapeutic activities per grid below to improve mobility, strength, coordination, and dynamic movement to improve functional lifting, carrying, reaching, catching, and overhead activities. Date:   Date:   Date:     Activity/Exercise Parameters Parameters Parameters                                                 MANUAL THERAPY: ( minutes):   Joint mobilization, Soft tissue mobilization, and Manipulation was utilized and necessary because of the patient's restricted joint motion, painful spasm, loss of articular motion, and restricted motion of soft tissue. Date  8/25/2022      Technique Used Grade Level # Time(s) Effect while being performed                                                                                         HEP Log Date        2.     3.    4.     5.        POC    Recertification Expiration Date      Plan of Care/Certification Expiration Date: 09/13/22     Visit Count  5    Number of Allowed Visits              Treatment/Session Summary:      Treatment Assessment:    COnitnued with strengthening and added circuti training to improve CV endurance. No pain with exercises and had no residual soreness post treatment. Communication/Consultation:       None today   Equipment provided today:  None   Recommendations/Intent for next  treatment session:  Next visit will focus on Centralization (lumbar extension) .       Total Treatment Billable Duration:  45 minutes  Time In: 8710  Time Out: [address] [facility] [place] Yanni Gonzales Charge Capture  }Post Session Pain  PT Visit Info  [facility] Portal  MD Guidelines  Scanned Media  Benefits  MyChart    Future Appointments   Date Time Provider Mau Mabel   8/29/2022  6:30 AM [facility] MRI UNIT 1 [facility]   8/30/2022  3:30 PM Newberry Malcolm, PT [facility]   9/1/2022  3:30 PM Newberry Malcolm, PT [facility]   9/8/2022  3:30 PM Newberry Ludlow, PT [facility]   9/13/2022  3:30 PM Newberry Ludlow, PT [facility]

## 2022-08-29 ENCOUNTER — HOSPITAL ENCOUNTER (OUTPATIENT)
Dept: MRI IMAGING | Age: 32
Discharge: HOME | End: 2022-09-01
Payer: COMMERCIAL

## 2022-08-29 DIAGNOSIS — M54.50 LOW BACK PAIN, UNSPECIFIED BACK PAIN LATERALITY, UNSPECIFIED CHRONICITY, UNSPECIFIED WHETHER SCIATICA PRESENT: ICD-10-CM

## 2022-08-29 PROCEDURE — 72148 MRI LUMBAR SPINE W/O DYE: CPT

## 2022-08-30 ENCOUNTER — HOSPITAL ENCOUNTER (OUTPATIENT)
Dept: PHYSICAL THERAPY | Age: 32
Setting detail: RECURRING SERIES
Discharge: HOME | End: 2022-09-02
Payer: COMMERCIAL

## 2022-08-30 PROCEDURE — 97110 THERAPEUTIC EXERCISES: CPT

## 2022-08-30 NOTE — PROGRESS NOTES
4-1  for 2 sets  10 min 2-2-2-2-2 10 min 2-2-2-2-2 10 min 3.1 mph 10 min 3.5 mph   Rolling patterns 10 min 6 min 3 rolls each side 3 rolls each side 3 rolls each side   rows 37lbs 3x10       Sidestep monster walks  Mint band 2xs Pink band 2xs Pink band 3xs Purple band    Deadlifts  65lbs 3x5 with coaching  Bar x5  75lbs 1x5  85lbs 3x5 75 1x5  95 3x5 X5 75  X5 105  2x5 115   Lats 43 lbs 3x 10       SLED 100 lbs 4 laps  100 lbs 4 laps     Goblet squats 15lbs 3x10 3x10          3 rounds   20lbs sit to stands  8 burpees  2 laps of 145 lbs SLED 3 rounds   10 calories  10 squats  8 burpees  145 sled push 2xs       THERAPEUTIC ACTIVITY: ( 0 minutes): Therapeutic activities per grid below to improve mobility, strength, coordination, and dynamic movement to improve functional lifting, carrying, reaching, catching, and overhead activities. Date:   Date:   Date:     Activity/Exercise Parameters Parameters Parameters                                                 MANUAL THERAPY: ( minutes):   Joint mobilization, Soft tissue mobilization, and Manipulation was utilized and necessary because of the patient's restricted joint motion, painful spasm, loss of articular motion, and restricted motion of soft tissue. Date  8/30/2022      Technique Used Grade Level # Time(s) Effect while being performed                                                                                         HEP Log Date        2.     3.    4.     5.        POC    Recertification Expiration Date      Plan of Care/Certification Expiration Date: 09/13/22     Visit Count  6    Number of Allowed Visits              Treatment/Session Summary:      Treatment Assessment:    Pt had good response to treatment and progressions. MIld tightneess pre treatmeant but allieviation of subjective symptoms post treatment.    Communication/Consultation:       None today   Equipment provided today:  None   Recommendations/Intent for next  treatment session: Next visit will focus on Centralization (lumbar extension) .       Total Treatment Billable Duration:  55 minutes  Time In: 7345  Time Out: [address] [place] PT       Charge Capture  }Post Session Pain  PT Visit Info  [facility] Portal  MD Guidelines  Scanned Media  Benefits  MyChart    Future Appointments   Date Time Provider Mau [place]   9/1/2022  3:30 PM Grisel Spence, PT [facility]   9/8/2022  3:30 PM Grisel Spence, PT [facility]   9/13/2022  3:30 PM Remus Spence, PT [facility]

## 2022-09-01 ENCOUNTER — HOSPITAL ENCOUNTER (OUTPATIENT)
Dept: PHYSICAL THERAPY | Age: 32
Setting detail: RECURRING SERIES
Discharge: HOME | End: 2022-09-04
Payer: COMMERCIAL

## 2022-09-01 PROCEDURE — 97110 THERAPEUTIC EXERCISES: CPT

## 2022-09-01 NOTE — PROGRESS NOTES
2-2-2-2-2 10 min 2-2-2-2-2 10 min 3.1 mph 10 min 3.5 mph 8 min 3x5    Rolling patterns 6 min 3 rolls each side 3 rolls each side 3 rolls each side    rows        Sidestep monster walks Mint band 2xs Pink band 2xs Pink band 3xs Purple band  Purple band    Deadlifts 65lbs 3x5 with coaching  Bar x5  75lbs 1x5  85lbs 3x5 75 1x5  95 3x5 X5 75  X5 105  2x5 115 X5 75  X5 115  3 X5 135   Lats        Sit to stands     30lbs 30xs   SLED  100 lbs 4 laps   195 4 min   Overhead press     45lbs 3x5   Goblet squats 3x10          3 rounds   20lbs sit to stands  8 burpees  2 laps of 145 lbs SLED 3 rounds   10 calories  10 squats  8 burpees  145 sled push 2xs        THERAPEUTIC ACTIVITY: ( 0 minutes): Therapeutic activities per grid below to improve mobility, strength, coordination, and dynamic movement to improve functional lifting, carrying, reaching, catching, and overhead activities. Date:   Date:   Date:     Activity/Exercise Parameters Parameters Parameters                                                 MANUAL THERAPY: ( minutes):   Joint mobilization, Soft tissue mobilization, and Manipulation was utilized and necessary because of the patient's restricted joint motion, painful spasm, loss of articular motion, and restricted motion of soft tissue. Date  9/1/2022      Technique Used Grade Level # Time(s) Effect while being performed                                                                                         HEP Log Date        2.     3.    4.     5.        POC    Recertification Expiration Date      Plan of Care/Certification Expiration Date: 09/13/22     Visit Count  7    Number of Allowed Visits              Treatment/Session Summary:      Treatment Assessment:    Pt cotninued with javier nd core strengthening. no pain with exercises and able to tolerate progressions well.    Communication/Consultation:       None today   Equipment provided today:  None   Recommendations/Intent for next treatment session:  Next visit will focus on Centralization (lumbar extension) .       Total Treatment Billable Duration:  45 minutes  Time In: 1807  Time Out: [address] [place], PT       Charge Capture  }Post Session Pain  PT Visit Info  [employer] Portal  MD Guidelines  Scanned Media  Benefits  MyChart    Future Appointments   Date Time Provider Mau [place]   9/8/2022  3:30 PM Desirae Don, PT [facility]   9/12/2022  3:00 PM ELSIE Pablo - CNP [facility]   9/13/2022  3:30 PM Desirae Don, PT [facility]

## 2022-09-08 ENCOUNTER — HOSPITAL ENCOUNTER (OUTPATIENT)
Dept: PHYSICAL THERAPY | Age: 32
Setting detail: RECURRING SERIES
Discharge: HOME | End: 2022-09-11
Payer: COMMERCIAL

## 2022-09-08 PROCEDURE — 97110 THERAPEUTIC EXERCISES: CPT

## 2022-09-08 NOTE — PROGRESS NOTES
Karen Stark  : 1990  Primary: Charisma Christensen  Secondary:  [address] @ [address] [facility] [address]  Phone: [phone]  Fax: [phone] Plan Frequency: 2x/ week for 6 weeks    Plan of Care/Certification Expiration Date: 22      PT Visit Info:  No data recorded   Visit Count:  8   OUTPATIENT PHYSICAL THERAPY:OP NOTE TYPE: Treatment Note 2022       Episode  }Appt Desk             Treatment Diagnosis:    Low Back Pain (M54.5)  Abnormal posture (R29.3)  Medical/Referring Diagnosis:  No admission diagnoses are documented for this encounter. Referring Physician:  ELSIE Vázquez MD Orders:  PT Eval and Treat   Date of Onset:  No data recorded   Allergies:   Wasp venom protein, Amoxicillin, and Other  Restrictions/Precautions:  No data recordedNo data recorded   Interventions Planned (Treatment may consist of any combination of the following):    Current Treatment Recommendations: Strengthening; ROM; Balance training; Functional mobility training; Stair training; Manual Therapy - Soft Tissue Mobilization; Manual Therapy - Joint Manipulation; Return to work related activity; Home exercise program     Subjective Comments:  Pt reports only time he has pain is when he waks up. Initial:}    0/10Post Session:       0/10  Medications Last Reviewed:  2022  Updated Objective Findings:  See evaluation note from today  Treatment     THERAPEUTIC EXERCISE: (45 minutes):    Exercises per grid below to improve mobility, strength, balance, and coordination. Progressed resistance and repetitions as indicated.      Date:  2022 Date  2022 Date  2022 Date  2022 Date  2022 Date  2022   Activity/Exercise Parameters        Education      Worked on [place] 10 min   Prone to elbows (extension) 10 press ups 10 press ups  10 press ups     Open book (sideline)         Lateral shift (against wall)         treadmill 10 min 2-2-2-2-2 10 min 2-2-2-2-2 10 min 3.1 mph 10 min 3.5 mph 8 min 3x5  8 min   Rolling patterns 6 min 3 rolls each side 3 rolls each side 3 rolls each side  2 rolls each side   rows         Sidestep monster walks Mint band 2xs Pink band 2xs Pink band 3xs Purple band  Purple band     Deadlifts 65lbs 3x5 with coaching  Bar x5  75lbs 1x5  85lbs 3x5 75 1x5  95 3x5 X5 75  X5 105  2x5 115 X5 75  X5 115  3 X5 135 X5 115  X5 135  X5 155   Lats         Sit to stands     30lbs 30xs 30lbs 30xs   SLED  100 lbs 4 laps   195 4 min    Overhead press     45lbs 3x5 45lbs 3x5   Goblet squats 3x10           3 rounds   20lbs sit to stands  8 burpees  2 laps of 145 lbs SLED 3 rounds   10 calories  10 squats  8 burpees  145 sled push 2xs         THERAPEUTIC ACTIVITY: ( 0 minutes): Therapeutic activities per grid below to improve mobility, strength, coordination, and dynamic movement to improve functional lifting, carrying, reaching, catching, and overhead activities. Date:   Date:   Date:     Activity/Exercise Parameters Parameters Parameters                                                 MANUAL THERAPY: ( minutes):   Joint mobilization, Soft tissue mobilization, and Manipulation was utilized and necessary because of the patient's restricted joint motion, painful spasm, loss of articular motion, and restricted motion of soft tissue. Date  9/8/2022      Technique Used Grade Level # Time(s) Effect while being performed                                                                                         HEP Log Date        2.     3.    4.     5.        POC    Recertification Expiration Date      Plan of Care/Certification Expiration Date: 09/13/22     Visit Count  8    Number of Allowed Visits              Treatment/Session Summary:      Treatment Assessment:    Pt continued with strengthneing the low back which has showed good response to stabilization. Nerual tension still present.    Communication/Consultation:       None today Equipment provided today:  None   Recommendations/Intent for next  treatment session:  Next visit will focus on Centralization (lumbar extension) .       Total Treatment Billable Duration:  45 minutes  Time In: 7380  Time Out: [address] Aleta Ibrahim PT       Charge Capture  }Post Session Pain  PT Visit Info  [facility] Portal  MD Guidelines  Scanned Media  Benefits  MyChart    Future Appointments   Date Time Provider Mau [place]   9/12/2022  3:00 PM Marco Caballero, APRN - CNP [facility]   9/13/2022  3:30 PM Gladys Fowler PT [facility]

## 2022-09-11 ASSESSMENT — PAIN SCALES - GENERAL: PAINLEVEL_OUTOF10: 0

## 2022-09-12 ENCOUNTER — OFFICE VISIT (OUTPATIENT)
Dept: NEUROSURGERY | Age: 32
End: 2022-09-12
Payer: COMMERCIAL

## 2022-09-12 VITALS
WEIGHT: 280 LBS | SYSTOLIC BLOOD PRESSURE: 121 MMHG | BODY MASS INDEX: 40.09 KG/M2 | HEIGHT: 70 IN | DIASTOLIC BLOOD PRESSURE: 80 MMHG | HEART RATE: 90 BPM | TEMPERATURE: 98.1 F | OXYGEN SATURATION: 95 %

## 2022-09-12 DIAGNOSIS — M54.50 CHRONIC RIGHT-SIDED LOW BACK PAIN WITHOUT SCIATICA: Primary | ICD-10-CM

## 2022-09-12 DIAGNOSIS — G89.29 CHRONIC RIGHT-SIDED LOW BACK PAIN WITHOUT SCIATICA: Primary | ICD-10-CM

## 2022-09-12 PROCEDURE — 99213 OFFICE O/P EST LOW 20 MIN: CPT | Performed by: NURSE PRACTITIONER

## 2022-09-12 ASSESSMENT — PATIENT HEALTH QUESTIONNAIRE - PHQ9
SUM OF ALL RESPONSES TO PHQ QUESTIONS 1-9: 0
SUM OF ALL RESPONSES TO PHQ QUESTIONS 1-9: 0
SUM OF ALL RESPONSES TO PHQ9 QUESTIONS 1 & 2: 0
2. FEELING DOWN, DEPRESSED OR HOPELESS: 0
SUM OF ALL RESPONSES TO PHQ QUESTIONS 1-9: 0
1. LITTLE INTEREST OR PLEASURE IN DOING THINGS: 0
SUM OF ALL RESPONSES TO PHQ QUESTIONS 1-9: 0

## 2022-09-12 NOTE — PROGRESS NOTES
[facility] NOTE:   History of Present Illness:    CC: Lumbar MRI review    Charo Zayas is a 32 y.o. male here to review his lumbar MRI. Patient states he has worsening low back pain almost in his tailbone off to the right side does seem to be worse in the mornings or after sitting for prolonged periods of time. Patient states that once he gets up moving and takes a hot shower the pain does ease off. The lumbar MRI reveals a shallow central disc protrusion at L5-S1 without evidence of nerve compromise.     Past Medical History:   Diagnosis Date    Adenomatous colon polyp     Asthma     BMI 39.0-39.9,adult     Cervical radiculopathy     HLD (hyperlipidemia)     Vitamin D deficiency       Past Surgical History:   Procedure Laterality Date    APPENDECTOMY      COLONOSCOPY  2014    adenomatous colon polyp  2017 dr. Gabby Chu EXTRACTION       Allergies   Allergen Reactions    Wasp Venom Protein Anaphylaxis    Amoxicillin Diarrhea    Other      cats      Family History   Problem Relation Age of Onset    Colon Cancer Maternal Grandfather     Thyroid Disease Mother     Alcohol Abuse Father     Heart Disease Brother     Parkinson's Disease Maternal Grandmother     Coronary Art Dis Father       Social History     Socioeconomic History    Marital status:      Spouse name: Not on file    Number of children: Not on file    Years of education: Not on file    Highest education level: Not on file   Occupational History    Not on file   Tobacco Use    Smoking status: Never    Smokeless tobacco: Never   Substance and Sexual Activity    Alcohol use: No    Drug use: No    Sexual activity: Not on file   Other Topics Concern    Not on file   Social History Narrative    Not on file     Social Determinants of Health     Financial Resource Strain: Not on file   Food Insecurity: Not on file   Transportation Needs: Not on file   Physical Activity: Not on file Stress: Not on file   Social Connections: Not on file   Intimate Partner Violence: Not on file   Housing Stability: Not on file     Current Outpatient Medications   Medication Sig Dispense Refill    meloxicam (MOBIC) 15 MG tablet Take 1 tablet by mouth in the morning. 30 tablet 3    albuterol sulfate  (90 Base) MCG/ACT inhaler Inhale 2 puffs into the lungs every 6 hours as needed      EPINEPHrine (EPIPEN) 0.3 MG/0.3ML SOAJ injection Inject 0.3 mg into the muscle once as needed       No current facility-administered medications for this visit. Patient Active Problem List   Diagnosis    DDD (degenerative disc disease), cervical    Neck pain          ROS Review of Systems    Constitutional:                    No recent weight changes, fever, fatigue, sleep difficulties, loss of appetite   ENT/Mouth:  No hearing loss, No ringing in the ears, chronic sinus problem, nose bleeds,sore throat, voice change, hoarseness, swollen glands in neck, or difficulties with chewing and swallowing. Cardiovascular:  No chest pain/angina pectoris, palpitations, swelling of feet/ankles/hands, or calf pain while walking. Respiratory: No chronic or frequent coughs, spitting up blood, shortness of breath, No asthma, or wheezing. Gastrointestinal: No a bdominal pain, heartburn, nausea, vomiting, constipation, or frequent diarrhea     Genitourinary: No frequent urination, burning or painful urination, or blood in urine     Musculoskeletal:   POS low back pain     Integument:   No rash/itching     Neurological:   Dizziness/vertigo, No numbness/tingling sensation, tremors, No weakness in limbs, frequent or recurring headaches, memory loss or confusion.        Physical Exam:    General: No acute distress  Head normocephalic and atraumatic  Mood and affect appropriate  CV: Regular rate   Resp: No increased work of breathing  Skin: warm and dry   Awake, alert, and oriented   Speech fluent  Eyes open spontaneously   Face symmetric and tongue midline on protrusion  Sternocleidomastoid and trapezius 5/5  No mid-line cervical, thoracic, or lumbar tenderness to palpation   Patient with strength exam as follows:   Upper Extremities: Right Left      Deltoid  5 5    Biceps  5 5    Triceps  5 5      5 5   Hand Intrinsics  5 5  Wrist flexors/extensors  5 5     Lower Extremities:      Hip Flex 5 5    Quads  5 5    Hamstrings 5 5    Dorsiflex 5 5    Plantarflex 5 5    EHL  5 5  Sensation intact to light touch and pin-prick   DTR 2+  No clonus or babinski present   No Yadav's sign present bilaterally   Gait normal    Assessment & Plan:  Petar Awad is a 32 y.o. male who presents to be evaluated for his chronic low back pain. I have independently reviewed and interpreted the patient's imaging and do not feel that he would benefit from a neurosurgical intervention at this time. The patient would like to continue taking the Mobic. Patient to follow-up here as needed. No diagnosis found. Notes are transcribed with [employer], a medical voice recording dictation service, and may contain minor errors.     Mirza Sheehan, EDDIE  [address]

## 2022-09-13 ENCOUNTER — APPOINTMENT (OUTPATIENT)
Dept: PHYSICAL THERAPY | Age: 32
End: 2022-09-13
Payer: COMMERCIAL

## 2023-03-06 ENCOUNTER — HOSPITAL ENCOUNTER (EMERGENCY)
Age: 33
Discharge: HOME | End: 2023-03-06
Attending: STUDENT IN AN ORGANIZED HEALTH CARE EDUCATION/TRAINING PROGRAM
Payer: COMMERCIAL

## 2023-03-06 ENCOUNTER — APPOINTMENT (OUTPATIENT)
Dept: GENERAL RADIOLOGY | Age: 33
End: 2023-03-06
Payer: COMMERCIAL

## 2023-03-06 VITALS
WEIGHT: 290 LBS | SYSTOLIC BLOOD PRESSURE: 139 MMHG | RESPIRATION RATE: 17 BRPM | BODY MASS INDEX: 41.52 KG/M2 | DIASTOLIC BLOOD PRESSURE: 102 MMHG | TEMPERATURE: 98.6 F | HEIGHT: 70 IN | HEART RATE: 92 BPM | OXYGEN SATURATION: 98 %

## 2023-03-06 DIAGNOSIS — S93.401A SPRAIN OF RIGHT ANKLE, UNSPECIFIED LIGAMENT, INITIAL ENCOUNTER: Primary | ICD-10-CM

## 2023-03-06 PROCEDURE — 73610 X-RAY EXAM OF ANKLE: CPT

## 2023-03-06 PROCEDURE — 99283 EMERGENCY DEPT VISIT LOW MDM: CPT

## 2023-03-06 ASSESSMENT — PAIN DESCRIPTION - LOCATION: LOCATION: ANKLE

## 2023-03-06 ASSESSMENT — PAIN DESCRIPTION - ORIENTATION: ORIENTATION: RIGHT

## 2023-03-06 ASSESSMENT — PAIN SCALES - GENERAL: PAINLEVEL_OUTOF10: 3

## 2023-03-06 ASSESSMENT — PAIN - FUNCTIONAL ASSESSMENT: PAIN_FUNCTIONAL_ASSESSMENT: 0-10

## 2023-03-07 NOTE — ED TRIAGE NOTES
Patient ambulatory to triage with c/o right ankle pain after getting side tackled playing soccer . States this happened about 11 days ago and he is still have continued pain.

## 2023-03-07 NOTE — ED PROVIDER NOTES
Emergency Department Provider Note                   PCP:                Kenyatta Gamboa MD               Age: 28 y.o. Sex: male     DISPOSITION Decision To Discharge 03/06/2023 09:48:03 PM       ICD-10-CM    1. Sprain of right ankle, unspecified ligament, initial encounter  S93.401A           MEDICAL DECISION MAKING  Complexity of Problems Addressed:  1 simple acute illness or injury    Data Reviewed and Analyzed:  Category 1:   I reviewed records from an external source: ED records from outside this hospital.  I reviewed records from an external source: provider visit notes from PCP. I reviewed records from an external source: provider visit notes from outside specialist.  I ordered each unique test.  I reviewed the results of each unique test.        Category 2:   I independently ordered and reviewed the X-rays. Right ankle x-rays negative for obvious fracture    Category 3: Discussion of management or test interpretation. 80-year-old male 11 days status post right ankle injury. Today's x-rays are negative for any fracture. There is just a small amount of swelling. We will treat ankle sprain with Ace wrap ice elevation continue over-the-counter Tylenol Motrin for pain. See primary care for routine recheck for any pain still present after 1 week       Risk of Complications and/or Morbidity of Patient Management:  OTC drug management performed     Emilie Donald is a 28 y.o. male who presents to the Emergency Department with chief complaint of    Chief Complaint   Patient presents with    Ankle Pain      Patient to ER complaining of continued right ankle pain after injury playing soccer. He states 11 days ago he was \"sideswiped by another player and twisted the ankle. He has continued pain and swelling. He has used ice elevation and Mobic for his symptoms.   He denies any knee or hip pain he has not seen a primary care physician for this problem    Past Medical History:  No date: Adenomatous colon polyp  No date: Asthma  No date: BMI 39.0-39.9,adult  No date: Cervical radiculopathy  No date: HLD (hyperlipidemia)  No date: Vitamin D deficiency     Past Surgical History:  No date: APPENDECTOMY  2014: COLONOSCOPY      Comment:  adenomatous colon polyp  2017 dr. Farzana Mir  No date: SEPTOPLASTY  No date: WISDOM TOOTH EXTRACTION          Review of Systems   All other systems reviewed and are negative. Vitals signs and nursing note reviewed. Patient Vitals for the past 4 hrs:   Temp Pulse Resp BP SpO2   03/06/23 2017 98.6 °F (37 °C) 92 17 (!) 139/102 98 %          Physical Exam  Vitals reviewed. Constitutional:       Appearance: Normal appearance. He is obese. HENT:      Head: Normocephalic and atraumatic. Right Ear: External ear normal.      Left Ear: External ear normal.      Nose: Nose normal.      Mouth/Throat:      Mouth: Mucous membranes are moist.      Pharynx: Oropharynx is clear. Eyes:      Extraocular Movements: Extraocular movements intact. Conjunctiva/sclera: Conjunctivae normal.      Pupils: Pupils are equal, round, and reactive to light. Cardiovascular:      Rate and Rhythm: Normal rate and regular rhythm. Pulmonary:      Effort: Pulmonary effort is normal.      Breath sounds: Normal breath sounds. Chest:      Chest wall: No tenderness. Abdominal:      General: Abdomen is flat. Bowel sounds are normal.      Palpations: Abdomen is soft. Musculoskeletal:         General: Swelling and tenderness present. Normal range of motion. Cervical back: Normal range of motion and neck supple. Comments: Right ankle with mild lateral swelling minimal tenderness to palpation over the lateral malleolus area no pain about the medial malleolus. Achilles tendon is intact. Good pedal pulses are noted good ankle motion noted no pain to the calf or knee   Skin:     General: Skin is warm and dry. Neurological:      General: No focal deficit present.       Mental Status: He is alert and oriented to person, place, and time. Psychiatric:         Mood and Affect: Mood normal.         Behavior: Behavior normal.        Procedures     Orders Placed This Encounter   Procedures    XR ANKLE RIGHT (MIN 3 VIEWS)    Apply ace wrap        Medications - No data to display    New Prescriptions    No medications on file        Past Medical History:   Diagnosis Date    Adenomatous colon polyp     Asthma     BMI 39.0-39.9,adult     Cervical radiculopathy     HLD (hyperlipidemia)     Vitamin D deficiency         Past Surgical History:   Procedure Laterality Date    APPENDECTOMY      COLONOSCOPY  2014    adenomatous colon polyp  2017 dr. Hercules Fuelling EXTRACTION          Family History   Problem Relation Age of Onset    Colon Cancer Maternal Grandfather     Thyroid Disease Mother     Alcohol Abuse Father     Heart Disease Brother     Parkinson's Disease Maternal Grandmother     Coronary Art Dis Father         Social History     Socioeconomic History    Marital status:      Spouse name: None    Number of children: None    Years of education: None    Highest education level: None   Tobacco Use    Smoking status: Never    Smokeless tobacco: Never   Substance and Sexual Activity    Alcohol use: No    Drug use: No        Allergies: Wasp venom protein, Amoxicillin, and Other    Previous Medications    ALBUTEROL SULFATE  (90 BASE) MCG/ACT INHALER    Inhale 2 puffs into the lungs every 6 hours as needed    EPINEPHRINE (EPIPEN) 0.3 MG/0.3ML SOAJ INJECTION    Inject 0.3 mg into the muscle once as needed    MELOXICAM (MOBIC) 15 MG TABLET    Take 1 tablet by mouth in the morning. Results for orders placed or performed during the hospital encounter of 03/06/23   XR ANKLE RIGHT (MIN 3 VIEWS)    Narrative    Exam: Right ankle, 3 views    INDICATION: Ankle injury    FINDINGS:  There is no fracture, dislocation, or other acute osseous abnormality.   Is   lateral soft tissue swelling. Impression    Soft tissue swelling. Cathleen Chaudhari M.D.   3/6/2023 9:41:00 PM        XR ANKLE RIGHT (MIN 3 VIEWS)   Final Result   Soft tissue swelling. Cathleen Chaudhari M.D.    3/6/2023 9:41:00 PM                        Voice dictation software was used during the making of this note. This software is not perfect and grammatical and other typographical errors may be present. This note has not been completely proofread for errors.       BOAZ Wallace  03/06/23 2147       BOAZ Wallace  03/06/23 2148

## 2023-03-07 NOTE — ED NOTES
I have reviewed discharge instructions with the patient. The patient verbalized understanding. Patient left ED via Discharge Method: ambulatory to Home with  self). Opportunity for questions and clarification provided. Patient given 0 scripts. To continue your aftercare when you leave the hospital, you may receive an automated call from our care team to check in on how you are doing. This is a free service and part of our promise to provide the best care and service to meet your aftercare needs.  If you have questions, or wish to unsubscribe from this service please call [phone]. Thank you for Choosing our [facility] Emergency Department.        Domi Bang RN  03/06/23 3308

## 2023-03-07 NOTE — ED NOTES
Pt c/o left ankle pain for 11 years. States that he was tackled while playing football.   Having difficulty ambulating     Elana Herman RN  03/06/23 [id number]

## 2023-03-22 ENCOUNTER — OFFICE VISIT (OUTPATIENT)
Dept: ORTHOPEDIC SURGERY | Age: 33
End: 2023-03-22

## 2023-03-22 VITALS — HEIGHT: 70 IN | BODY MASS INDEX: 40.09 KG/M2 | WEIGHT: 280 LBS

## 2023-03-22 DIAGNOSIS — S99.911A INJURY OF RIGHT ANKLE, INITIAL ENCOUNTER: Primary | ICD-10-CM

## 2023-03-22 DIAGNOSIS — S99.921A INJURY OF RIGHT FOOT, INITIAL ENCOUNTER: ICD-10-CM

## 2023-03-22 RX ORDER — PREDNISONE 5 MG/1
TABLET ORAL
Qty: 1 EACH | Refills: 2 | Status: SHIPPED | OUTPATIENT
Start: 2023-03-22

## 2023-03-22 NOTE — PROGRESS NOTES
The patient was prescribed a walker boot for the patient's right foot. The patient wears a size NA shoe and I fitted them with a L size boot. The patient was fitted and instructed on the use of prescribed walker boot. I explained how to fit themselves and that the plastic flexible piece should always be on the front of the boot and secured by the Velcro straps on top. The air bladder in the boot was adjusted according to proper fit and comfort. The patient walked a short distance and acknowledged satisfaction with current fit. I also explained that they need a heel lift or a higher heeled shoe for the uninvolved LE to help normalize gait and avoid excessive low back stress/strain due to leg length inequality created from walker boot. The patient was also prescribed and fitted with an evenup shoe lift for the left side. Patient read and signed documenting they understand and agree to [facility]'s current DME return policy.
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

## 2023-03-22 NOTE — LETTER
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       If you have any questions or concerns, please don't hesitate to call.     Sincerely,        Maria Dolores Panda MD

## 2023-04-05 ENCOUNTER — OFFICE VISIT (OUTPATIENT)
Dept: ORTHOPEDIC SURGERY | Age: 33
End: 2023-04-05

## 2023-04-05 VITALS — WEIGHT: 290 LBS | HEIGHT: 70 IN | BODY MASS INDEX: 41.52 KG/M2

## 2023-04-05 DIAGNOSIS — S99.911A INJURY OF RIGHT ANKLE, INITIAL ENCOUNTER: Primary | ICD-10-CM

## 2023-04-05 NOTE — PROGRESS NOTES
Name: Joe Choi  YOB: 1990  Gender: male  MRN: [id number]    04/05/2023: Felt much better on Prednisone but now recurrent pain     HPI:   02/23/2023: Right ankle injury playing soccer  03/06/2023: [facility] ED  03/22/2023: Right ankle pain    ROS/Meds/PSH/PMH/FH/SH: reviewed today    Tobacco:  reports that he has never smoked. He has never used smokeless tobacco.   Chronic low back/neck pain    Physical Examination:  Patient appears to be alert and oriented with acceptable appearance. No obvious distress or SOB  CV: appears to have acceptable vascular color and capillary refill  Neuro: appears to have mostly intact light touch sensation   Skin: Right anterior lateral ankle/hindfoot soft tissue swelling  MS: Standing: Plantigrade: Gait: 3D boot  Right = ATFL/CFL pain; no medial ankle/Deltoid pain; no syndesmotic pain  Right = ACP/bifurcate ligament pain; no medial foot pain; no TMT pain  Right = protects full motion and strength testing    XR: Right: Standing AP lateral mortise ankle plus AP oblique foot taken 03/22/2023 with os trigonum; narrowing calcaneonavicular region but intact appearing ACP; mortise and syndesmosis appear intact; no acute fracture  XR Impression:  As above      Reviewed Test/Records/Documents:  03/06/2023: [facility] ED: Reflects soccer injury 11 days prior to ER visit: Diagnosed with right ankle sprain: Reports negative x-ray for fracture:  03/06/2023: [facility] ED x-ray: Radiologic impression: Soft tissue swelling     Assessment:    Right ankle injury: ATFL/CFL sprain  Right hindfoot injury: Anterior calcaneal process fracture vs bifurcate ligament injury    Plan:   The patient and I discussed the above assessment. We explored treatment options.      He felt much better on Prednisone, but with his persistent pain, MRI scan is indicated     Advanced medical imaging: Right ankle/hindfoot MRI scan: Assess ankle/ankle injury for: Usual ankle sprains and bifurcate ligament sprain/intracranial

## 2023-04-05 NOTE — LETTER
April 5, 2023       Ortiz Nina YOB: 1990   GATO Vera Date of Visit:  4/5/2023       To Whom It May Concern: It is my medical opinion that [employer] Drilling {Work release (duty restriction):93624}. If you have any questions or concerns, please don't hesitate to call.     Sincerely,        Favio Vargas MD

## 2023-04-17 ENCOUNTER — OFFICE VISIT (OUTPATIENT)
Dept: ORTHOPEDIC SURGERY | Age: 33
End: 2023-04-17

## 2023-04-17 DIAGNOSIS — S99.921D INJURY OF RIGHT FOOT, SUBSEQUENT ENCOUNTER: ICD-10-CM

## 2023-04-17 DIAGNOSIS — S99.911D INJURY OF RIGHT ANKLE, SUBSEQUENT ENCOUNTER: Primary | ICD-10-CM

## 2023-04-17 NOTE — PROGRESS NOTES
The patient was prescribed a Wraptor brace for the patient's rightfoot. The patient wears a size NA shoe and I fitted the patient with a L brace. I explained how to fit the brace properly by pulling the lace tabs across top of foot first then under arch and lastly pulling the strap up firmly and attaching to the lateral Velcro strip. Thus forming a figure 8 across the ankle joint. Once the figure 8 is completed they are to secure the top (short circumferential) straps to help avoid the straps from loosening with normal wear. The patient was able to demonstrate proper fitting in office to ensure compliance with device and acknowledged satisfaction with current fit. The patient was also prescribed and fitted with a Reparel ankle sleeve for the right foot, size large. Patient read and signed documenting they understand and agree to [facility]'s current DME return policy.

## 2023-04-17 NOTE — PROGRESS NOTES
Name: Carie Dutta  YOB: 1990  Gender: male  MRN: [id number]    04/05/2023: Felt much better on Prednisone but recurrent pain   04/17/2023: Here to discuss MRI scan. Feels much better    HPI:   02/23/2023: Right ankle injury playing soccer  03/06/2023: [facility] ED  03/22/2023: Right ankle pain    ROS/Meds/PSH/PMH/FH/SH: reviewed today    Tobacco:  reports that he has never smoked. He has never used smokeless tobacco.   Chronic low back/neck pain    Physical Examination:  Patient appears to be alert and oriented with acceptable appearance. No obvious distress or SOB  CV: appears to have acceptable vascular color and capillary refill  Neuro: appears to have mostly intact light touch sensation   Skin: Right anterior lateral ankle/hindfoot soft tissue swelling  MS: Standing: Plantigrade: Gait: Full 3D boot  Right = much less pain; good motion; full strength     XR: Right: Standing AP lateral mortise ankle plus AP oblique foot taken 03/22/2023 with os trigonum; narrowing calcaneonavicular region but intact appearing ACP; mortise and syndesmosis appear intact; no acute fracture  XR Impression:  As above      Reviewed Test/Records/Documents:  03/06/2023: [facility] ED: Reflects soccer injury 11 days prior to ER visit: Diagnosed with right ankle sprain: Reports negative x-ray for fracture:  03/06/2023: [facility] ED x-ray: Radiologic impression: Soft tissue swelling     04/11/2023: Right ankle MRI scan without contrast: Radiology impression:  1. Increased signal and edema along the distal calcaneofibular ligament, likely reflecting sprain/partial tear  2. Mild thickening of the anterior talofibular ligament with adjacent edema, likely low-grade sprain  3. Mild peroneal and tibialis posterior tenosynovitis  4. Os trigonum with mild internal edema/cystic change  5. Posterior ankle effusion  6.   Anterolateral ankle/hindfoot subcutaneous edema    04/17/2023: Discussed injection posterior hindfoot    Assessment:    Right

## 2023-09-15 ENCOUNTER — HOSPITAL ENCOUNTER (EMERGENCY)
Age: 33
Discharge: HOME | End: 2023-09-16
Attending: EMERGENCY MEDICINE
Payer: COMMERCIAL

## 2023-09-15 DIAGNOSIS — R11.2 NAUSEA AND VOMITING, UNSPECIFIED VOMITING TYPE: ICD-10-CM

## 2023-09-15 DIAGNOSIS — R10.9 ACUTE ABDOMINAL PAIN: Primary | ICD-10-CM

## 2023-09-15 DIAGNOSIS — R19.7 DIARRHEA, UNSPECIFIED TYPE: ICD-10-CM

## 2023-09-15 PROCEDURE — 80053 COMPREHEN METABOLIC PANEL: CPT

## 2023-09-15 PROCEDURE — 85025 COMPLETE CBC W/AUTO DIFF WBC: CPT

## 2023-09-15 PROCEDURE — 99285 EMERGENCY DEPT VISIT HI MDM: CPT

## 2023-09-15 PROCEDURE — 81003 URINALYSIS AUTO W/O SCOPE: CPT

## 2023-09-15 PROCEDURE — 83690 ASSAY OF LIPASE: CPT

## 2023-09-15 ASSESSMENT — PAIN - FUNCTIONAL ASSESSMENT: PAIN_FUNCTIONAL_ASSESSMENT: 0-10

## 2023-09-15 ASSESSMENT — PAIN DESCRIPTION - LOCATION: LOCATION: ABDOMEN

## 2023-09-15 ASSESSMENT — PAIN DESCRIPTION - DESCRIPTORS: DESCRIPTORS: CRAMPING;DISCOMFORT

## 2023-09-15 ASSESSMENT — PAIN SCALES - GENERAL: PAINLEVEL_OUTOF10: 7

## 2023-09-15 ASSESSMENT — LIFESTYLE VARIABLES
HOW MANY STANDARD DRINKS CONTAINING ALCOHOL DO YOU HAVE ON A TYPICAL DAY: PATIENT DOES NOT DRINK
HOW OFTEN DO YOU HAVE A DRINK CONTAINING ALCOHOL: NEVER

## 2023-09-16 ENCOUNTER — APPOINTMENT (OUTPATIENT)
Dept: CT IMAGING | Age: 33
End: 2023-09-16
Payer: COMMERCIAL

## 2023-09-16 VITALS
SYSTOLIC BLOOD PRESSURE: 113 MMHG | TEMPERATURE: 98.2 F | WEIGHT: 290 LBS | HEIGHT: 70 IN | RESPIRATION RATE: 20 BRPM | HEART RATE: 93 BPM | OXYGEN SATURATION: 97 % | BODY MASS INDEX: 41.52 KG/M2 | DIASTOLIC BLOOD PRESSURE: 84 MMHG

## 2023-09-16 LAB
ALBUMIN SERPL-MCNC: 4.8 G/DL (ref 3.5–5)
ALBUMIN/GLOB SERPL: 1.5 (ref 0.4–1.6)
ALP SERPL-CCNC: 98 U/L (ref 45–117)
ALT SERPL-CCNC: 40 U/L (ref 13–61)
ANION GAP SERPL CALC-SCNC: 11 MMOL/L (ref 2–11)
APPEARANCE UR: CLEAR
AST SERPL-CCNC: 29 U/L (ref 15–37)
BASOPHILS # BLD: 0.1 K/UL (ref 0–0.2)
BASOPHILS NFR BLD: 0 % (ref 0–2)
BILIRUB SERPL-MCNC: 0.5 MG/DL (ref 0.2–1.1)
BILIRUB UR QL: NEGATIVE
BUN SERPL-MCNC: 18 MG/DL (ref 6–23)
CALCIUM SERPL-MCNC: 9.5 MG/DL (ref 8.3–10.4)
CHLORIDE SERPL-SCNC: 101 MMOL/L (ref 98–107)
CO2 SERPL-SCNC: 28 MMOL/L (ref 21–32)
COLOR UR: YELLOW
CREAT SERPL-MCNC: 0.96 MG/DL (ref 0.8–1.5)
DIFFERENTIAL METHOD BLD: ABNORMAL
EOSINOPHIL # BLD: 0.2 K/UL (ref 0–0.8)
EOSINOPHIL NFR BLD: 1 % (ref 0.5–7.8)
ERYTHROCYTE [DISTWIDTH] IN BLOOD BY AUTOMATED COUNT: 13.2 % (ref 11.9–14.6)
GLOBULIN SER CALC-MCNC: 3.1 G/DL (ref 2.8–4.5)
GLUCOSE SERPL-MCNC: 107 MG/DL (ref 65–100)
GLUCOSE UR STRIP.AUTO-MCNC: NEGATIVE MG/DL
HCT VFR BLD AUTO: 49.3 % (ref 41.1–50.3)
HGB BLD-MCNC: 16.4 G/DL (ref 13.6–17.2)
HGB UR QL STRIP: NEGATIVE
IMM GRANULOCYTES # BLD AUTO: 0.1 K/UL (ref 0–0.5)
IMM GRANULOCYTES NFR BLD AUTO: 1 % (ref 0–5)
KETONES UR QL STRIP.AUTO: NEGATIVE MG/DL
LEUKOCYTE ESTERASE UR QL STRIP.AUTO: NEGATIVE
LIPASE SERPL-CCNC: 27 U/L (ref 13–60)
LYMPHOCYTES # BLD: 1.5 K/UL (ref 0.5–4.6)
LYMPHOCYTES NFR BLD: 11 % (ref 13–44)
MCH RBC QN AUTO: 28.3 PG (ref 26.1–32.9)
MCHC RBC AUTO-ENTMCNC: 33.3 G/DL (ref 31.4–35)
MCV RBC AUTO: 85 FL (ref 82–102)
MONOCYTES # BLD: 0.7 K/UL (ref 0.1–1.3)
MONOCYTES NFR BLD: 6 % (ref 4–12)
NEUTS SEG # BLD: 10.8 K/UL (ref 1.7–8.2)
NEUTS SEG NFR BLD: 81 % (ref 43–78)
NITRITE UR QL STRIP.AUTO: NEGATIVE
NRBC # BLD: 0 K/UL (ref 0–0.2)
PH UR STRIP: 5.5 (ref 5–9)
PLATELET # BLD AUTO: 293 K/UL (ref 150–450)
PMV BLD AUTO: 8.9 FL (ref 9.4–12.3)
POTASSIUM SERPL-SCNC: 4.2 MMOL/L (ref 3.5–5.1)
PROT SERPL-MCNC: 7.9 G/DL (ref 6.4–8.2)
PROT UR STRIP-MCNC: NEGATIVE MG/DL
RBC # BLD AUTO: 5.8 M/UL (ref 4.23–5.6)
SODIUM SERPL-SCNC: 140 MMOL/L (ref 133–143)
SP GR UR REFRACTOMETRY: >=1.03 (ref 1–1.02)
UROBILINOGEN UR QL STRIP.AUTO: 0.2 EU/DL (ref 0.2–1)
WBC # BLD AUTO: 13.3 K/UL (ref 4.3–11.1)

## 2023-09-16 PROCEDURE — 2580000003 HC RX 258: Performed by: EMERGENCY MEDICINE

## 2023-09-16 PROCEDURE — 74177 CT ABD & PELVIS W/CONTRAST: CPT

## 2023-09-16 PROCEDURE — 96375 TX/PRO/DX INJ NEW DRUG ADDON: CPT

## 2023-09-16 PROCEDURE — 6360000002 HC RX W HCPCS: Performed by: EMERGENCY MEDICINE

## 2023-09-16 PROCEDURE — 96361 HYDRATE IV INFUSION ADD-ON: CPT

## 2023-09-16 PROCEDURE — 96374 THER/PROPH/DIAG INJ IV PUSH: CPT

## 2023-09-16 PROCEDURE — 6360000004 HC RX CONTRAST MEDICATION: Performed by: EMERGENCY MEDICINE

## 2023-09-16 RX ORDER — ONDANSETRON 4 MG/1
4 TABLET, ORALLY DISINTEGRATING ORAL 3 TIMES DAILY PRN
Qty: 21 TABLET | Refills: 0 | Status: SHIPPED | OUTPATIENT
Start: 2023-09-16

## 2023-09-16 RX ORDER — 0.9 % SODIUM CHLORIDE 0.9 %
1000 INTRAVENOUS SOLUTION INTRAVENOUS
Status: COMPLETED | OUTPATIENT
Start: 2023-09-16 | End: 2023-09-16

## 2023-09-16 RX ORDER — OMEPRAZOLE 20 MG/1
20 CAPSULE, DELAYED RELEASE ORAL DAILY
Qty: 30 CAPSULE | Refills: 2 | Status: SHIPPED | OUTPATIENT
Start: 2023-09-16

## 2023-09-16 RX ORDER — HYDROCODONE BITARTRATE AND ACETAMINOPHEN 5; 325 MG/1; MG/1
1 TABLET ORAL EVERY 6 HOURS PRN
Qty: 8 TABLET | Refills: 0 | Status: SHIPPED | OUTPATIENT
Start: 2023-09-16 | End: 2023-09-19

## 2023-09-16 RX ORDER — SODIUM CHLORIDE 0.9 % (FLUSH) 0.9 %
10 SYRINGE (ML) INJECTION
Status: COMPLETED | OUTPATIENT
Start: 2023-09-16 | End: 2023-09-16

## 2023-09-16 RX ORDER — 0.9 % SODIUM CHLORIDE 0.9 %
100 INTRAVENOUS SOLUTION INTRAVENOUS
Status: COMPLETED | OUTPATIENT
Start: 2023-09-16 | End: 2023-09-16

## 2023-09-16 RX ORDER — HYDROMORPHONE HYDROCHLORIDE 1 MG/ML
0.5 INJECTION, SOLUTION INTRAMUSCULAR; INTRAVENOUS; SUBCUTANEOUS
Status: COMPLETED | OUTPATIENT
Start: 2023-09-16 | End: 2023-09-16

## 2023-09-16 RX ORDER — ONDANSETRON 2 MG/ML
4 INJECTION INTRAMUSCULAR; INTRAVENOUS
Status: COMPLETED | OUTPATIENT
Start: 2023-09-16 | End: 2023-09-16

## 2023-09-16 RX ADMIN — ONDANSETRON 4 MG: 2 INJECTION INTRAMUSCULAR; INTRAVENOUS at 00:52

## 2023-09-16 RX ADMIN — IOPAMIDOL 100 ML: 755 INJECTION, SOLUTION INTRAVENOUS at 01:06

## 2023-09-16 RX ADMIN — SODIUM CHLORIDE, PRESERVATIVE FREE 10 ML: 5 INJECTION INTRAVENOUS at 01:06

## 2023-09-16 RX ADMIN — HYDROMORPHONE HYDROCHLORIDE 0.5 MG: 1 INJECTION, SOLUTION INTRAMUSCULAR; INTRAVENOUS; SUBCUTANEOUS at 00:53

## 2023-09-16 RX ADMIN — SODIUM CHLORIDE 1000 ML: 9 INJECTION, SOLUTION INTRAVENOUS at 00:51

## 2023-09-16 RX ADMIN — SODIUM CHLORIDE 100 ML: 9 INJECTION, SOLUTION INTRAVENOUS at 01:06

## 2023-09-16 ASSESSMENT — PAIN SCALES - GENERAL: PAINLEVEL_OUTOF10: 7

## 2023-09-16 NOTE — ED TRIAGE NOTES
Pt ambulatory to room 10 with steady gait. Pt c/o diarrhea over the weekend with improvement to soft stools throughout the week and increase in abdominal pain around his umbilicus as well as nausea. Pt denies any vomiting. Pt in NAD during triage.

## 2023-09-28 ASSESSMENT — ENCOUNTER SYMPTOMS
DIARRHEA: 1
CONSTIPATION: 0
BLOOD IN STOOL: 0
VOMITING: 1
NAUSEA: 1
ABDOMINAL PAIN: 1